# Patient Record
Sex: FEMALE | Race: WHITE | NOT HISPANIC OR LATINO | URBAN - METROPOLITAN AREA
[De-identification: names, ages, dates, MRNs, and addresses within clinical notes are randomized per-mention and may not be internally consistent; named-entity substitution may affect disease eponyms.]

---

## 2024-08-11 ENCOUNTER — EMERGENCY (EMERGENCY)
Facility: HOSPITAL | Age: 59
LOS: 1 days | Discharge: HOME | End: 2024-08-11
Admitting: EMERGENCY MEDICINE
Payer: COMMERCIAL

## 2024-08-11 VITALS
SYSTOLIC BLOOD PRESSURE: 105 MMHG | DIASTOLIC BLOOD PRESSURE: 66 MMHG | OXYGEN SATURATION: 97 % | TEMPERATURE: 99 F | HEART RATE: 72 BPM | WEIGHT: 143.96 LBS | RESPIRATION RATE: 18 BRPM

## 2024-08-11 PROCEDURE — 99283 EMERGENCY DEPT VISIT LOW MDM: CPT

## 2024-08-11 PROCEDURE — 99284 EMERGENCY DEPT VISIT MOD MDM: CPT

## 2024-08-11 RX ORDER — VALACYCLOVIR 500 MG/1
1 TABLET, FILM COATED ORAL
Qty: 21 | Refills: 0
Start: 2024-08-11 | End: 2024-08-17

## 2024-08-11 RX ORDER — VALACYCLOVIR 500 MG/1
1000 TABLET, FILM COATED ORAL ONCE
Refills: 0 | Status: COMPLETED | OUTPATIENT
Start: 2024-08-11 | End: 2024-08-11

## 2024-08-11 RX ORDER — ERYTHROMYCIN 5 MG/G
1 OINTMENT OPHTHALMIC
Qty: 1 | Refills: 0
Start: 2024-08-11 | End: 2024-08-15

## 2024-08-11 RX ORDER — ERYTHROMYCIN 5 MG/G
1 OINTMENT OPHTHALMIC ONCE
Refills: 0 | Status: COMPLETED | OUTPATIENT
Start: 2024-08-11 | End: 2024-08-11

## 2024-08-11 RX ADMIN — VALACYCLOVIR 1000 MILLIGRAM(S): 500 TABLET, FILM COATED ORAL at 16:11

## 2024-08-11 RX ADMIN — ERYTHROMYCIN 1 APPLICATION(S): 5 OINTMENT OPHTHALMIC at 16:11

## 2024-08-11 NOTE — ED PROVIDER NOTE - OBJECTIVE STATEMENT
58 y/o f presents c/o left eye redness x 2 days, rash to left cheek for the past day.  Pt stating her left eye feels itchy, rash is more itchy, slightly burning.  Denies visual changes, discharge from eye, all other ROS negative.

## 2024-08-11 NOTE — ED PROVIDER NOTE - EYES, MLM
left eye +sclera injection, PERRLA, EOMI, visual acuity 20/30 OS, fluorescein stain no uptake, no dendritic lesions

## 2024-08-11 NOTE — ED PROVIDER NOTE - NSFOLLOWUPINSTRUCTIONS_ED_ALL_ED_FT
Shingles  A rash with blisters on the skin.  Shingles, or herpes zoster, is an infection. It gives you a skin rash and blisters. These infected areas may hurt a lot.    Shingles only happens if:  You've had chickenpox.  You've been given a shot called a vaccine to protect you from getting chickenpox. Shingles is rare in this case.  What are the causes?  Shingles is caused by a germ called the varicella-zoster virus. This is the same germ that causes chickenpox. After you're exposed to the germ, it stays in your body but is dormant. This means it isn't active.    Shingles happens if the germ becomes active again. This can happen years after you're first exposed to the germ.    What increases the risk?  You may be more likely to get shingles if:  You're older than 60 years of age.  You're under a lot of stress.  You have a weak immune system. The immune system is your body's defense system. It may be weak if:  You have human immunodeficiency virus (HIV).  You have acquired immunodeficiency syndrome (AIDS).  You have cancer.  You take medicines that weaken your immune system. These include organ transplant medicines.  What are the signs or symptoms?  The first symptoms of shingles may be itching, tingling, or pain. Your skin may feel like it's burning.    A few days or weeks later, you'll get a rash. Here's what you can expect:  The rash is likely to be on one side of your body.  The rash may be shaped like a belt or a band. Over time, it will turn into blisters filled with fluid.  The blisters will break open and change into scabs.  The scabs will dry up in about 2–3 weeks.  You may also have:  A fever.  Chills.  A headache.  Nausea.  How is this diagnosed?  Shingles is diagnosed with a skin exam. A sample called a culture may be taken from one of your blisters and sent to a lab. This will show if you have shingles.    How is this treated?  The rash may last for several weeks. There's no cure for shingles, but your health care provider may give you medicines. These medicines may:  Help with pain.  Help with itching.  Help with irritation and swelling.  Help you get better sooner.  Help to prevent long-term problems.  If the rash is on your face, you may need to see an eye doctor or an ear, nose, and throat (ENT) doctor.    Follow these instructions at home:  Medicines    Take your medicines only as told by your provider.  Put an anti-itch cream or numbing cream on the rash or blisters as told by your provider.  Relieving itching and discomfort    A bathtub filled with water.  To help with itching:  Put cold, wet cloths called cold compresses on the rash or blisters.  Take a cool bath. Try adding baking soda or dry oatmeal to the water. Do not bathe in hot water.  Use calamine lotion on the rash or blisters. You can get this type of lotion at the store.  Blister and rash care    Keep your rash covered with a loose bandage.  Wear loose clothes that don't rub on your rash.  Take care of your rash as told by your provider. Make sure you:  Wash your hands with soap and water for at least 20 seconds before and after you change your bandage. If you can't use soap and water, use hand .  Keep your rash and blisters clean by washing them with mild soap and cool water.  Change your bandage.  Check your rash every day for signs of infection. Check for:  More redness, swelling, or pain.  Fluid or blood.  Warmth.  Pus or a bad smell.  Do not scratch your rash. Do not pick at your blisters. To help you not scratch:  Keep your fingernails clean and cut short.  Try to wear gloves or mittens when you sleep.  General instructions    Rest.  Wash your hands often with soap and water for at least 20 seconds. If you can't use soap and water, use hand . Washing your hands lowers your chance of getting a skin infection.  Your infection can cause chickenpox in others. If you have blisters that aren't scabs yet, stay away from:  Babies.  Pregnant people.  Children who have eczema.  Older people who have organ transplants.  People who have a long-term, or chronic, illness.  Anyone who hasn't had chickenpox before.  Anyone who hasn't gotten the chickenpox vaccine.  How is this prevented?  Vaccines are the best way to prevent you from getting chickenpox or shingles. Talk with your provider about getting these shots.    Where to find more information  Centers for Disease Control and Prevention (CDC): cdc.gov  Contact a health care provider if:  Your pain doesn't get better with medicine.  Your pain doesn't get better after the rash heals.  You have any signs of infection around the rash.  Your rash or blisters get worse.  You have a fever or chills.  Get help right away if:  The rash is on your face or nose.  You have pain in your face or by your eye.  You lose feeling on one side of your face.  You have trouble seeing.  You have ear pain or ringing in your ear.  This information is not intended to replace advice given to you by your health care provider. Make sure you discuss any questions you have with your health care provider.    Document Revised: 02/02/2024 Document Reviewed: 02/02/2024

## 2024-08-11 NOTE — ED ADULT TRIAGE NOTE - CHIEF COMPLAINT QUOTE
Pt co L eye redness/ itching since Thursday night. Denies injury or trauma, no recent illness. Vision intact.

## 2024-08-11 NOTE — ED PROVIDER NOTE - CLINICAL SUMMARY MEDICAL DECISION MAKING FREE TEXT BOX
58 y/o f presents c/o left eye redness x 2 days, rash under left eye for 1 day.  Pt afebrile in ED, visual acuity intact, rash could be shingles, has 2 vesicular lesions to left upper cheek, neg fluorescein stain.  Will start on valtrex, erythromycin ophthalmic ointment, f/u outpatient with ophthalmology.

## 2024-08-11 NOTE — ED ADULT NURSE NOTE - OBJECTIVE STATEMENT
Patient with left eye redness and swelling since Thursday. patient was see at urgent care ans sent her to ED to r/o herpes zoster on her eye.

## 2024-08-11 NOTE — ED PROVIDER NOTE - PATIENT PORTAL LINK FT
You can access the FollowMyHealth Patient Portal offered by [facility] by registering at the following website: [email or web address]. By joining [facility]’s FollowMyHealth portal, you will also be able to view your health information using other applications (apps) compatible with our system.

## 2024-08-14 DIAGNOSIS — R23.8 OTHER SKIN CHANGES: ICD-10-CM

## 2024-08-14 DIAGNOSIS — H57.89 OTHER SPECIFIED DISORDERS OF EYE AND ADNEXA: ICD-10-CM
